# Patient Record
Sex: FEMALE | Race: BLACK OR AFRICAN AMERICAN
[De-identification: names, ages, dates, MRNs, and addresses within clinical notes are randomized per-mention and may not be internally consistent; named-entity substitution may affect disease eponyms.]

---

## 2018-04-07 ENCOUNTER — HOSPITAL ENCOUNTER (EMERGENCY)
Dept: HOSPITAL 62 - ER | Age: 33
LOS: 1 days | Discharge: HOME | End: 2018-04-08
Payer: SELF-PAY

## 2018-04-07 DIAGNOSIS — Z3A.01: ICD-10-CM

## 2018-04-07 DIAGNOSIS — R10.2: ICD-10-CM

## 2018-04-07 DIAGNOSIS — O26.891: Primary | ICD-10-CM

## 2018-04-07 DIAGNOSIS — R10.30: ICD-10-CM

## 2018-04-07 DIAGNOSIS — N89.8: ICD-10-CM

## 2018-04-07 LAB
APPEARANCE UR: CLEAR
APTT PPP: YELLOW S
BILIRUB UR QL STRIP: NEGATIVE
GLUCOSE UR STRIP-MCNC: NEGATIVE MG/DL
KETONES UR STRIP-MCNC: NEGATIVE MG/DL
NITRITE UR QL STRIP: NEGATIVE
PH UR STRIP: 6 [PH] (ref 5–9)
PROT UR STRIP-MCNC: NEGATIVE MG/DL
SP GR UR STRIP: 1.03
UROBILINOGEN UR-MCNC: 4 MG/DL (ref ?–2)

## 2018-04-07 PROCEDURE — 86900 BLOOD TYPING SEROLOGIC ABO: CPT

## 2018-04-07 PROCEDURE — 84702 CHORIONIC GONADOTROPIN TEST: CPT

## 2018-04-07 PROCEDURE — 86901 BLOOD TYPING SEROLOGIC RH(D): CPT

## 2018-04-07 PROCEDURE — 76817 TRANSVAGINAL US OBSTETRIC: CPT

## 2018-04-07 PROCEDURE — 81001 URINALYSIS AUTO W/SCOPE: CPT

## 2018-04-07 PROCEDURE — 87591 N.GONORRHOEAE DNA AMP PROB: CPT

## 2018-04-07 PROCEDURE — 99284 EMERGENCY DEPT VISIT MOD MDM: CPT

## 2018-04-07 PROCEDURE — 87491 CHLMYD TRACH DNA AMP PROBE: CPT

## 2018-04-07 PROCEDURE — 81025 URINE PREGNANCY TEST: CPT

## 2018-04-07 PROCEDURE — 85025 COMPLETE CBC W/AUTO DIFF WBC: CPT

## 2018-04-07 PROCEDURE — 80053 COMPREHEN METABOLIC PANEL: CPT

## 2018-04-07 PROCEDURE — 93976 VASCULAR STUDY: CPT

## 2018-04-07 PROCEDURE — 36415 COLL VENOUS BLD VENIPUNCTURE: CPT

## 2018-04-07 PROCEDURE — 87210 SMEAR WET MOUNT SALINE/INK: CPT

## 2018-04-07 NOTE — ER DOCUMENT REPORT
ED GI/





- General


Chief Complaint: Vaginal Discharge


Stated Complaint: VAGINAL DISCHARGE


Time Seen by Provider: 04/07/18 22:59


Mode of Arrival: Ambulatory


Information source: Patient


TRAVEL OUTSIDE OF THE [place] IN LAST 30 DAYS: No





- HPI


Patient complains to provider of: Vaginal discharge


Notes: 





04/07/18 23:35


Patient is here with complaints of vaginal discharge with some lower abdominal 

pain for the last several days.  States the pain seems to come and go.  She has 

had some vaginal discharge but denies any vaginal bleeding.  Patient states 

that when she was urinating a few days ago something came out of her vagina 

that had seeds in it.  She states that while she was here urinating that 

something came out of her vagina again that had seeds in it.  She denies 

sticking anything in her vagina that would resemble this.  She denies being in 

a situation that someone could stick some in her vagina that she would not be 

aware of.  Patient denies any fevers.  She denies any nausea, vomiting, 

diarrhea.  She denies any chest pain or shortness of breath.  She denies any 

other complaints at this time.





- Related Data


Allergies/Adverse Reactions: 


 





No Known Allergies Allergy (Unverified 04/07/18 21:45)


 











Past Medical History





- Social History


Smoking Status: Unknown if Ever Smoked


Family History: Reviewed & Not Pertinent





Review of Systems





- Review of Systems


-: Yes All other systems reviewed and negative





Physical Exam





- Vital signs


Vitals: 


 











Temp Pulse Resp BP Pulse Ox


 


 98.9 F   84   16   145/81 H  100 


 


 04/07/18 21:57  04/07/18 21:57  04/07/18 21:57  04/07/18 21:57  04/07/18 21:57














- Notes


Notes: 





GENERAL: alert, cooperative, nontoxic, no distress.


HEAD: normocephalic, atraumatic


EYES: conjunctiva pink without discharge, no external redness or swelling.


EARS: no external swelling, no external redness


NOSE: atraumatic, no external swelling


MOUTH/THROAT: mucous membranes moist and pink, posterior pharynx without 

erythema, swelling, exudate. No trismus or drooling.


NECK: soft, supple, full range of motion, no meningismus.


CHEST: no distress, lungs clear and equal throughout.  No wheezing, rales, 

rhonchi.


CARDIAC: regular rate and rhythm, no murmur, normal capillary refill, normal 

pulses.  No peripheral edema noted.


ABDOMEN: Soft, minimal tenderness to the suprapubic area.  No rebound 

tenderness or guarding.  No mass.  No right lower quadrant tenderness or pain 

at McBurney's point.


BACK: full range of motion, no CVA tenderness.


EXTREMITIES: full range of motion of all extremities.  No redness, no swelling.


NEURO: alert and oriented x 3, no focal deficits, full range of motion of all 

extremities.


PYSCH: appropriate mood, affect.  Patient is cooperative.


SKIN: pink, warm, dry, no rash.


: Performed with female chaperone at the bedside.  No external lesions.  No 

foreign bodies identified within the vagina.  Cervix is closed.  Moderate 

amount of white vaginal discharge noted.  Mild left adnexal tenderness on 

bimanual exam with no mass.  No cervical motion tenderness.  No bleeding.





Course





- Re-evaluation


Re-evalutation: 





04/08/18 02:35


Patient is nontoxic.  Stable vitals.  She is here with complaints of some mild 

lower abdominal pain.  Patient noted to have a positive urine pregnancy test.  

Since she is having some mild lower abdominal tenderness, labs and ultrasound 

were ordered.  Quantitative hCG is over 100,000.  Ultrasound shows intrauterine 

pregnancy at approximately 7 weeks with a fetal heart rate of 121.  Remainder 

of her labs are unremarkable.  The patient states that she had 2 abnormal 

things fall out of her vagina over the last few weeks with the last occurring 

while she was here in the emergency department.  She shows me what appears to 

be a small fruit such as a grape with deteriorating skin and seeds.  I did not 

appreciate any foreign bodies on her vaginal exam at this time.  It is possible 

that she could have had a partially digested fruit from her rectum that she 

thought came from her vagina as this certainly would not make sense if she has 

not placed any foreign bodies into her vagina.  This point the patient will be 

discharged home with instructions to follow-up with her OB/GYN at the next 

available appointment.  Follow-up sooner for worsening pain, high fever, for 

severe bleeding, persistent vomiting, or for any further concerns.





The patient is noted to have elevated blood pressure during today's emergency 

department visit.  The patient was informed of this finding.  The patient was 

instructed that this may be related to pre-hypertension and requires further 

evaluation with a primary care provider.  The patient has no hypertensive 

symptoms at this time.





The patient's emergency department workup and current diagnosis were explained 

to the patient and or family.  Follow-up instructions were provided.  

Medications if prescribed were discussed. Instructions for when to return to 

the emergency department including specific  worrisome symptoms were discussed 

with the patient and/or family.





- Vital Signs


Vital signs: 


 











Temp Pulse Resp BP Pulse Ox


 


 98.9 F   84   16   145/81 H  100 


 


 04/07/18 21:57  04/07/18 21:57  04/07/18 21:57  04/07/18 21:57  04/07/18 21:57














- Laboratory


Result Diagrams: 


 04/08/18 00:48





 04/08/18 00:48


Laboratory results interpreted by me: 


 











  04/07/18 04/08/18 04/08/18





  23:02 00:48 00:48


 


Hgb   11.4 L 


 


Hct   35.5 L 


 


MCH   26.5 L 


 


RDW   15.6 H 


 


Sodium    136.4 L


 


Beta HCG, Quant    140389.00 H


 


Urine Blood  SMALL H  


 


Urine Urobilinogen  4.0 H  


 


Urine HCG, Qual  POSITIVE H  














- Diagnostic Test


Radiology reviewed: Image reviewed, Reports reviewed - Ultrasound shows 7 week 

intrauterine pregnancy





Discharge





- Discharge


Clinical Impression: 


 Pelvic pain





Pregnancy


Qualifiers:


 Weeks of gestation: less than 8 weeks Qualified Code(s): Z3A.01 - Less than 8 

weeks gestation of pregnancy





Condition: Stable


Disposition: HOME, SELF-CARE


Instructions:  Pelvic Pain in Pregnancy ([facility])


Additional Instructions: 


Follow-up with your OB/GYN at the next available appointment.  Follow-up sooner 

for increasing pain, high fever, persistent vomiting, severe vaginal bleeding, 

or for any further concerns.





Your blood pressure was elevated during today's visit.  Have this rechecked 

with your doctor.


Forms:  Elevated Blood Pressure


Referrals: 


BRADY SHAY MD [ACTIVE STAFF] - Follow up as needed

## 2018-04-08 VITALS — SYSTOLIC BLOOD PRESSURE: 115 MMHG | DIASTOLIC BLOOD PRESSURE: 67 MMHG

## 2018-04-08 LAB
ADD MANUAL DIFF: NO
ALBUMIN SERPL-MCNC: 3.9 G/DL (ref 3.5–5)
ALP SERPL-CCNC: 57 U/L (ref 38–126)
ALT SERPL-CCNC: 21 U/L (ref 9–52)
ANION GAP SERPL CALC-SCNC: 7 MMOL/L (ref 5–19)
AST SERPL-CCNC: 15 U/L (ref 14–36)
BASOPHILS # BLD AUTO: 0.1 10^3/UL (ref 0–0.2)
BASOPHILS NFR BLD AUTO: 0.8 % (ref 0–2)
BILIRUB DIRECT SERPL-MCNC: 0 MG/DL (ref 0–0.4)
BILIRUB SERPL-MCNC: 0.2 MG/DL (ref 0.2–1.3)
BUN SERPL-MCNC: 16 MG/DL (ref 7–20)
CALCIUM: 9.7 MG/DL (ref 8.4–10.2)
CHLAM PCR: NOT DETECTED
CHLORIDE SERPL-SCNC: 103 MMOL/L (ref 98–107)
CO2 SERPL-SCNC: 26 MMOL/L (ref 22–30)
EOSINOPHIL # BLD AUTO: 0.1 10^3/UL (ref 0–0.6)
EOSINOPHIL NFR BLD AUTO: 1.5 % (ref 0–6)
ERYTHROCYTE [DISTWIDTH] IN BLOOD BY AUTOMATED COUNT: 15.6 % (ref 11.5–14)
GLUCOSE SERPL-MCNC: 93 MG/DL (ref 75–110)
GON PCR: NOT DETECTED
HCT VFR BLD CALC: 35.5 % (ref 36–47)
HGB BLD-MCNC: 11.4 G/DL (ref 12–15.5)
LYMPHOCYTES # BLD AUTO: 3 10^3/UL (ref 0.5–4.7)
LYMPHOCYTES NFR BLD AUTO: 32.8 % (ref 13–45)
MCH RBC QN AUTO: 26.5 PG (ref 27–33.4)
MCHC RBC AUTO-ENTMCNC: 32.1 G/DL (ref 32–36)
MCV RBC AUTO: 83 FL (ref 80–97)
MONOCYTES # BLD AUTO: 0.8 10^3/UL (ref 0.1–1.4)
MONOCYTES NFR BLD AUTO: 9.2 % (ref 3–13)
NEUTROPHILS # BLD AUTO: 5 10^3/UL (ref 1.7–8.2)
NEUTS SEG NFR BLD AUTO: 55.7 % (ref 42–78)
PLATELET # BLD: 260 10^3/UL (ref 150–450)
POTASSIUM SERPL-SCNC: 4.2 MMOL/L (ref 3.6–5)
PROT SERPL-MCNC: 6.7 G/DL (ref 6.3–8.2)
RBC # BLD AUTO: 4.31 10^6/UL (ref 3.72–5.28)
RBCS (WET MOUNT): (no result)
SODIUM SERPL-SCNC: 136.4 MMOL/L (ref 137–145)
T.VAGINALIS (WET MOUNT): (no result)
TOTAL CELLS COUNTED % (AUTO): 100 %
WBC # BLD AUTO: 9 10^3/UL (ref 4–10.5)
WBCS (WET MOUNT): (no result)
YEAST (WET MOUNT): (no result)

## 2018-04-08 NOTE — RADIOLOGY REPORT (SQ)
EXAM DESCRIPTION: U/S OB TRANSVAG W/DOPPLER



CLINICAL HISTORY: 33 years Female, preg, pelvic pain. LMP

3/16/2018.



COMPARISON: None.



TECHNIQUE: Complete first trimester obstetrical ultrasound with

transvaginal imaging. Somewhat suboptimal evaluation due to

patient body habitus and bowel gas.



FINDINGS: 



Uterus measures 12.1 x 6.9 x 8.4 cm. Within the medullary canal

there is a gestational sac. The fetus is identified with a

crown-rump length of 1.0 cm compatible with an estimated

gestational age of 7 weeks, 1 day. Fetal heart rate of 121 bpm.

Yolk sac is not visualized. No myometrial abnormalities. Cervical

length of 4.0 cm and closed.



No free pelvic fluid.



The left ovary is identified.



The right ovary measures 3.3 x 3.9 x 2.9 cm. Limited color and

spectral Doppler imaging demonstrates flow within the right

ovary.



IMPRESSION:



1. Single live intrauterine pregnancy with estimated gestational

age of 7 weeks, 1 day. Fetal heart rate of 121 bpm.

## 2018-05-24 ENCOUNTER — HOSPITAL ENCOUNTER (EMERGENCY)
Dept: HOSPITAL 62 - ER | Age: 33
Discharge: HOME | End: 2018-05-24
Payer: SELF-PAY

## 2018-05-24 VITALS — SYSTOLIC BLOOD PRESSURE: 125 MMHG | DIASTOLIC BLOOD PRESSURE: 66 MMHG

## 2018-05-24 DIAGNOSIS — R50.9: ICD-10-CM

## 2018-05-24 DIAGNOSIS — B37.9: ICD-10-CM

## 2018-05-24 DIAGNOSIS — R51: ICD-10-CM

## 2018-05-24 DIAGNOSIS — N76.0: Primary | ICD-10-CM

## 2018-05-24 DIAGNOSIS — B96.89: ICD-10-CM

## 2018-05-24 DIAGNOSIS — Z98.890: ICD-10-CM

## 2018-05-24 DIAGNOSIS — R10.9: ICD-10-CM

## 2018-05-24 DIAGNOSIS — N93.9: ICD-10-CM

## 2018-05-24 LAB
ADD MANUAL DIFF: NO
ALBUMIN SERPL-MCNC: 3.9 G/DL (ref 3.5–5)
ALP SERPL-CCNC: 72 U/L (ref 38–126)
ALT SERPL-CCNC: 17 U/L (ref 9–52)
ANION GAP SERPL CALC-SCNC: 11 MMOL/L (ref 5–19)
APPEARANCE UR: (no result)
APTT PPP: YELLOW S
AST SERPL-CCNC: 12 U/L (ref 14–36)
BACTERIA (WET MOUNT): (no result)
BASOPHILS # BLD AUTO: 0 10^3/UL (ref 0–0.2)
BASOPHILS NFR BLD AUTO: 0.2 % (ref 0–2)
BILIRUB DIRECT SERPL-MCNC: 0.2 MG/DL (ref 0–0.4)
BILIRUB SERPL-MCNC: 0.6 MG/DL (ref 0.2–1.3)
BILIRUB UR QL STRIP: NEGATIVE
BUN SERPL-MCNC: 11 MG/DL (ref 7–20)
CALCIUM: 9.1 MG/DL (ref 8.4–10.2)
CHLAM PCR: NOT DETECTED
CHLORIDE SERPL-SCNC: 102 MMOL/L (ref 98–107)
CO2 SERPL-SCNC: 29 MMOL/L (ref 22–30)
EOSINOPHIL # BLD AUTO: 0.1 10^3/UL (ref 0–0.6)
EOSINOPHIL NFR BLD AUTO: 0.8 % (ref 0–6)
EPITHELIALS (WET MOUNT): (no result)
ERYTHROCYTE [DISTWIDTH] IN BLOOD BY AUTOMATED COUNT: 14.6 % (ref 11.5–14)
GLUCOSE SERPL-MCNC: 80 MG/DL (ref 75–110)
GLUCOSE UR STRIP-MCNC: NEGATIVE MG/DL
GON PCR: NOT DETECTED
HCT VFR BLD CALC: 36.9 % (ref 36–47)
HGB BLD-MCNC: 11.9 G/DL (ref 12–15.5)
KETONES UR STRIP-MCNC: (no result) MG/DL
LIPASE SERPL-CCNC: 29.2 U/L (ref 23–300)
LYMPHOCYTES # BLD AUTO: 1.3 10^3/UL (ref 0.5–4.7)
LYMPHOCYTES NFR BLD AUTO: 12.3 % (ref 13–45)
MCH RBC QN AUTO: 27 PG (ref 27–33.4)
MCHC RBC AUTO-ENTMCNC: 32.2 G/DL (ref 32–36)
MCV RBC AUTO: 84 FL (ref 80–97)
MONOCYTES # BLD AUTO: 1 10^3/UL (ref 0.1–1.4)
MONOCYTES NFR BLD AUTO: 9.5 % (ref 3–13)
NEUTROPHILS # BLD AUTO: 8.1 10^3/UL (ref 1.7–8.2)
NEUTS SEG NFR BLD AUTO: 77.2 % (ref 42–78)
NITRITE UR QL STRIP: NEGATIVE
PH UR STRIP: 5 [PH] (ref 5–9)
PLATELET # BLD: 278 10^3/UL (ref 150–450)
POTASSIUM SERPL-SCNC: 3.9 MMOL/L (ref 3.6–5)
PROT SERPL-MCNC: 7.4 G/DL (ref 6.3–8.2)
PROT UR STRIP-MCNC: 30 MG/DL
RBC # BLD AUTO: 4.41 10^6/UL (ref 3.72–5.28)
RBCS (WET MOUNT): (no result)
SODIUM SERPL-SCNC: 142.1 MMOL/L (ref 137–145)
SP GR UR STRIP: 1.03
T.VAGINALIS (WET MOUNT): (no result)
TOTAL CELLS COUNTED % (AUTO): 100 %
UROBILINOGEN UR-MCNC: 4 MG/DL (ref ?–2)
WBC # BLD AUTO: 10.5 10^3/UL (ref 4–10.5)
WBCS (WET MOUNT): (no result)
YEAST (WET MOUNT): (no result)

## 2018-05-24 PROCEDURE — 87210 SMEAR WET MOUNT SALINE/INK: CPT

## 2018-05-24 PROCEDURE — 85025 COMPLETE CBC W/AUTO DIFF WBC: CPT

## 2018-05-24 PROCEDURE — 80076 HEPATIC FUNCTION PANEL: CPT

## 2018-05-24 PROCEDURE — 36415 COLL VENOUS BLD VENIPUNCTURE: CPT

## 2018-05-24 PROCEDURE — 86901 BLOOD TYPING SEROLOGIC RH(D): CPT

## 2018-05-24 PROCEDURE — 96372 THER/PROPH/DIAG INJ SC/IM: CPT

## 2018-05-24 PROCEDURE — 83690 ASSAY OF LIPASE: CPT

## 2018-05-24 PROCEDURE — 99284 EMERGENCY DEPT VISIT MOD MDM: CPT

## 2018-05-24 PROCEDURE — 87491 CHLMYD TRACH DNA AMP PROBE: CPT

## 2018-05-24 PROCEDURE — 76817 TRANSVAGINAL US OBSTETRIC: CPT

## 2018-05-24 PROCEDURE — 86900 BLOOD TYPING SEROLOGIC ABO: CPT

## 2018-05-24 PROCEDURE — 81001 URINALYSIS AUTO W/SCOPE: CPT

## 2018-05-24 PROCEDURE — 87591 N.GONORRHOEAE DNA AMP PROB: CPT

## 2018-05-24 PROCEDURE — 84702 CHORIONIC GONADOTROPIN TEST: CPT

## 2018-05-24 PROCEDURE — 80048 BASIC METABOLIC PNL TOTAL CA: CPT

## 2018-05-24 NOTE — ER DOCUMENT REPORT
ED General





- General


Chief Complaint: Abdominal Pain


Stated Complaint: STOMACHE PAINS, HEADACHES


Time Seen by Provider: 18 13:19


Mode of Arrival: Ambulatory


Information source: Patient


Notes: 





33-year-old female presents to emergency department for evaluation of abdominal 

pain, chills, headaches, and tactile fever.  Patient reports that she had an 

 a month ago.  She reports bleeding has been going on for approximately 

a month and that the fever started 2 days ago.  Denies any dysuria.  Denies any 

vaginal discharge.  She also denies any chest pain, shortness of breath, or 

wheezing.


TRAVEL OUTSIDE OF THE [place] IN LAST 30 DAYS: No





- Related Data


Allergies/Adverse Reactions: 


 





cephalexin [From Keflex] Allergy (Verified 18 13:31)


 swelling











Past Medical History





- General


Information source: Patient





- Social History


Smoking Status: Never Smoker


Chew tobacco use (# tins/day): No


Frequency of alcohol use: None


Drug Abuse: None


Family History: Reviewed & Not Pertinent


Patient has suicidal ideation: No


Patient has homicidal ideation: No


Renal/ Medical History: Denies: Hx Peritoneal Dialysis


Past Surgical History: Reports: Hx  Section - x 3





Review of Systems





- Review of Systems


-: Yes All other systems reviewed and negative





Physical Exam





- Vital signs


Vitals: 


 











Temp Pulse Resp BP Pulse Ox


 


 99.7 F   102 H  18   132/76 H  100 


 


 18 12:43  18 12:43  18 12:43  18 12:43  18 12:43














- Notes


Notes: 





PHYSICAL EXAMINATION:





GENERAL: Well-appearing, well-nourished and in no acute distress.





HEAD: Atraumatic, normocephalic.





NECK: Normal range of motion, supple without lymphadenopathy





ABDOMEN: Soft, paralyzed tenderness with no guarding, rigidity, rebound 

tenderness, or peritoneal signs.  Nondistended abdomen. No masses appreciated.





Female : No external lesions noted.  Moderate blood in vaginal canal with no 

discharge.  Cervix closed.  Mild apical motion tenderness.  No adnexal 

tenderness





Musculoskeletal: Normal range of motion, no pitting or edema.  No cyanosis.





NEUROLOGICAL: Normal gait, balance, speech, and facial symmetry





PSYCH: Normal mood, normal affect.





SKIN: Warm, Dry, normal turgor, no rashes or lesions noted.





Course





- Re-evaluation


Re-evalutation: 





18 18:10


Patient presented to the emergency department for evaluation of vaginal 

bleeding and abdominal pain.  She was nontoxic or septic appearing in no acute 

or respiratory distress.  Patient was afebrile and not hypoxic.  Consistent 

with vaginal bleeding, bacterial vaginosis, and yeast infection.  No evidence 

of endometritis.  Patient had a moderate amount of bleeding and vaginal canal 

with no evidence of discharge.  Patient's hCG was 68.2.  Ultrasound could not 

rule out ectopic pregnancy.  She was treated with 2 g of azithromycin and into 

myosin due to patient being allergic to Keflex.  Discharged home with Flagyl 

and Diflucan.  I advised patient to follow-up on Saturday at the emergency 

department to have her hCG retested.  I also advised her to return immediately 

to the emergency department for any new, worsening, or concerning symptoms as 

discussed.  She understands and agrees with plan.  Case was discussed with Dr. Hart attending and he agrees with assessment and plan.








- Vital Signs


Vital signs: 


 











Temp Pulse Resp BP Pulse Ox


 


 99.7 F   102 H  18   132/76 H  100 


 


 18 12:43  18 12:43  18 12:43  18 12:43  18 12:43














- Laboratory


Result Diagrams: 


 18 13:40





 18 13:40


Laboratory results interpreted by me: 


 











  18





  13:40 13:40 13:40


 


Hgb  11.9 L  


 


RDW  14.6 H  


 


Lymphocytes %  12.3 L  


 


AST   12 L 


 


Beta HCG, Quant   68.82 H 


 


Urine Protein    30 H


 


Urine Ketones    TRACE H


 


Urine Blood    LARGE H


 


Urine Urobilinogen    4.0 H














Discharge





- Discharge


Clinical Impression: 


 Bacterial vaginosis, Yeast infection, Vaginal bleeding





Condition: Good


Disposition: HOME, SELF-CARE


Instructions:  Vaginal Bleeding ([facility]), Vaginosis, Bacterial ([facility])


Additional Instructions: 


Please return to the emergency department on Saturday to have your hCG 

retested.  Today your hCG was 68.82.  Please take medications as instructed and 

return immediately to the emergency department for any new, worsening, or 

concerning symptoms as we discussed.


Prescriptions: 


Fluconazole [Diflucan 100 Mg Tablet] 100 mg PO DAILY #1 tablet


Metronidazole [Flagyl 500 mg Tablet] 500 mg PO BID #14 tablet

## 2018-05-24 NOTE — RADIOLOGY REPORT (SQ)
EXAM DESCRIPTION:  U/S OB TRANSVAGINAL W/O DOP



COMPLETED DATE/TIME:  2018 3:31 pm



REASON FOR STUDY:  Eab April; eval rPOC, bleeding pain



COMPARISON:  Pelvic ultrasound 2018



TECHNIQUE:  Endovaginal static and realtime grayscale images acquired of the pelvis. Additional selec
mayra spectral and color Doppler images recorded. All images stored on PACs.

BHC.8



LIMITATIONS:  Left adnexal bowel gas



FINDINGS:  UTERUS: No visualized intrauterine pregnancy.  Uterus is 9 x 7 x 6.5 cm in size.  Endometr
ial stripe 14 mm in thickness.  No intrauterine gestational sac.  No gross evidence of retained produ
cts of conception or fluid in the endometrial canal.

RIGHT ADNEXA: Normal ovary with normal vascular flow.  Right ovary 3.6 x 2.2 x 2 cm in size with a 1 
cm cyst right ovary.

No adnexal free fluid.

No adnexal masses.

LEFT ADNEXA: Not visualized due to adnexal bowel gas.

FREE FLUID: Physiologic cul-de-sac fluid is present

OTHER: No other significant finding.



IMPRESSION:  NO VISUALIZED INTRA- OR EXTRAUTERINE PREGNANCY.

bHCG LEVEL TOO LOW TO EXPECT VISUALIZATION OF PREGNANCY.

ECTOPIC PREGNANCY CANNOT BE EXCLUDED.

FOLLOW-UP ULTRASOUND AND SERIAL BHCG LEVELS STRONGLY RECOMMENDED TO ACCURATELY ASSESS PREGNANCY STATU
S.



TECHNICAL DOCUMENTATION:  JOB ID:  [id number]

  [employer]- All Rights Reserved



Reading location - IP/workstation name: [email or web address]

## 2018-05-24 NOTE — ER DOCUMENT REPORT
ED Medical Screen (RME)





- General


Chief Complaint: Abdominal Pain


Stated Complaint: STOMACHE PAINS, HEADACHES


Time Seen by Provider: 18 13:19


Notes: 





RAPID MEDICAL EVALUATION DISCLOSURE


I have seen this patient as part of a Rapid Medical Evaluation and, if 

applicable, placed any initially appropriate orders. The patient will be seen 

and fully evaluated, including a full history and physical exam, by a provider (

in Main ED or Fast Track) when a room becomes available.





------------------------------------------------------------------





33-year-old female status post elective  2018 here with 

complaints of upper and lower abdominal pain as well as vaginal bleeding 

ongoing since the .  She is going through 4 pads daily.  She just 

started having the pain and fever 2 days ago.  She denies any vaginal discharge 

dysuria.





EXAM


Mild epigastric TTP


Moderate suprapubic TTP


TRAVEL OUTSIDE OF THE [place] IN LAST 30 DAYS: No





- Related Data


Allergies/Adverse Reactions: 


 





cephalexin [From Keflex] Allergy (Verified 18 13:31)


 swelling











Past Medical History





- Social History


Chew tobacco use (# tins/day): No


Frequency of alcohol use: None


Drug Abuse: None


Renal/ Medical History: Denies: Hx Peritoneal Dialysis


Past Surgical History: Reports: Hx  Section - x 3





Physical Exam





- Vital signs


Vitals: 





 











Temp Pulse Resp BP Pulse Ox


 


 99.7 F   102 H  18   132/76 H  100 


 


 18 12:43  18 12:43  18 12:43  18 12:43  18 12:43














Course





- Vital Signs


Vital signs: 





 











Temp Pulse Resp BP Pulse Ox


 


 99.7 F   102 H  18   132/76 H  100 


 


 18 12:43  18 12:43  18 12:43  18 12:43  18 12:43

## 2018-05-26 ENCOUNTER — HOSPITAL ENCOUNTER (EMERGENCY)
Dept: HOSPITAL 62 - ER | Age: 33
Discharge: HOME | End: 2018-05-26
Payer: SELF-PAY

## 2018-05-26 VITALS — DIASTOLIC BLOOD PRESSURE: 77 MMHG | SYSTOLIC BLOOD PRESSURE: 129 MMHG

## 2018-05-26 DIAGNOSIS — R03.0: ICD-10-CM

## 2018-05-26 DIAGNOSIS — O03.9: Primary | ICD-10-CM

## 2018-05-26 PROCEDURE — 99282 EMERGENCY DEPT VISIT SF MDM: CPT

## 2018-05-26 PROCEDURE — 36415 COLL VENOUS BLD VENIPUNCTURE: CPT

## 2018-05-26 PROCEDURE — 84702 CHORIONIC GONADOTROPIN TEST: CPT

## 2018-05-26 NOTE — ER DOCUMENT REPORT
ED General





- General


Chief Complaint: Medical Complaint


Stated Complaint: BLOODWORK


Time Seen by Provider: 18 15:53


Mode of Arrival: Ambulatory


Information source: Patient


TRAVEL OUTSIDE OF THE [place] IN LAST 30 DAYS: No





- HPI


Patient complains to provider of: bloodwork recheck


Notes: 





Patient is here with complaints of needing to have her pregnancy hormone level 

rechecked.  She was seen here on  and was diagnosed with pregnancy and 

was noted to be approximately 7 weeks pregnant at that time.  One month ago she 

had an .  She was here 2 days ago for some abdominal pain was noted to 

have bacterial vaginosis.  She was treated for gonorrhea and Chlamydia was also 

treated for bacterial vaginosis.  She had a pregnancy hormone level of 68 

yesterday and was told to return in 2 days to have this rechecked.  She had an 

ultrasound 2 days ago showing no intrauterine pregnancy or other significant 

abnormality.  She denies any abdominal pain today, no fever.  She denies any 

nausea, vomiting, diarrhea.  No dysuria or hematuria.  No rash.  No chest pain 

or shortness of breath.  She has no other complaints at this time.





- Related Data


Allergies/Adverse Reactions: 


 





cephalexin [From Keflex] Allergy (Verified 18 15:33)


 swelling











Past Medical History





- Social History


Smoking Status: Unknown if Ever Smoked


Family History: Reviewed & Not Pertinent


Renal/ Medical History: Denies: Hx Peritoneal Dialysis


Past Surgical History: Reports: Hx  Section - x 3





Review of Systems





- Review of Systems


-: Yes All other systems reviewed and negative





Physical Exam





- Vital signs


Vitals: 


 











Temp Pulse Resp BP Pulse Ox


 


 98.5 F   86   18   129/77 H  98 


 


 18 15:36  18 15:36  18 15:36  18 15:36  18 15:36














- Notes


Notes: 





GENERAL: alert, cooperative, nontoxic, no distress.


HEAD: normocephalic, atraumatic


EYES: conjunctiva pink without discharge, no external redness or swelling.


EARS: no external swelling, no external redness


NOSE: atraumatic, no external swelling


MOUTH/THROAT: mucous membranes moist and pink, posterior pharynx without 

erythema, swelling, exudate. No trismus or drooling.


NECK: soft, supple, full range of motion, no meningismus.


CHEST: no distress, lungs clear and equal throughout.  No wheezing, rales, 

rhonchi.


CARDIAC: regular rate and rhythm, no murmur, normal capillary refill, normal 

pulses.  No peripheral edema noted.


ABDOMEN: Soft, nontender.


BACK: full range of motion, no CVA tenderness.


EXTREMITIES: full range of motion of all extremities.  No redness, no swelling.


NEURO: alert and oriented x 3, no focal deficits, full range of motion of all 

extremities.


PYSCH: appropriate mood, affect.  Patient is cooperative.


SKIN: pink, warm, dry, no rash.





Course





- Re-evaluation


Re-evalutation: 





18 16:42


Patient is nontoxic appearing with stable vitals.  The patient is here with 

complaints of needing to have her quantitative hCG rechecked.  She was here 

initially in April and was diagnosed with a 7 week pregnancy.  She had an 

 one month ago.  She was seen here 2 days ago for abdominal pain and 

had a quantitative hCG of 68.  She was also diagnosed with pectoral vaginosis.  

She was treated for gonorrhea and chlamydia as well as pectoral vaginosis 2 

days ago.  She is no longer having any abdominal pain, was told to come back to 

the emergency department to recheck her hCG level today it is 46, and is 

trending down which would be consistent with her previous .  She will 

be instructed to follow-up with the health department OB/GYN in the next few 

weeks to ensure that it is down to 0, but the fact that it is trending down is 

reassuring.  She has no abdominal tenderness on her exam at this time.  Patient 

can be discharged home.  She should follow-up if she develops any worsening pain

, high fever, persistent vomiting, or has any further concerns.





The patient's emergency department workup and current diagnosis were explained 

to the patient and or family.  Follow-up instructions were provided.  

Medications if prescribed were discussed. Instructions for when to return to 

the emergency department including specific  worrisome symptoms were discussed 

with the patient and/or family.





The patient is noted to have elevated blood pressure during today's emergency 

department visit.  The patient was informed of this finding.  The patient was 

instructed that this may be related to pre-hypertension and requires further 

evaluation with a primary care provider.  The patient has no hypertensive 

symptoms at this time.





- Vital Signs


Vital signs: 


 











Temp Pulse Resp BP Pulse Ox


 


 98.5 F   86   18   129/77 H  98 


 


 05/26/18 15:36  18 15:36  18 15:36  18 15:36  18 15:36














- Laboratory


Laboratory results interpreted by me: 


 











  18





  15:30


 


Beta HCG, Quant  46.88 H














Discharge





- Discharge


Clinical Impression: 


 





Condition: Stable


Disposition: HOME, SELF-CARE


Instructions:  Miscarriage ([facility])


Additional Instructions: 


Follow-up with your OB/GYN or primary care doctor in the next several weeks to 

ensure that your pregnancy hormone level is back down to 0.  It is down to 46 

from 68.  Follow-up sooner for worsening pain, fever, persistent vomiting, 

severe abdominal pain, or for any further concerns.





Your blood pressure was elevated during today's visit.  Have this rechecked 

with your doctor.


Forms:  Elevated Blood Pressure, Smoking Cessation Education


Referrals: 


YAW ANDERSEN MD [[facility]] - Follow up as needed